# Patient Record
Sex: MALE | Race: WHITE | NOT HISPANIC OR LATINO | Employment: STUDENT | URBAN - METROPOLITAN AREA
[De-identification: names, ages, dates, MRNs, and addresses within clinical notes are randomized per-mention and may not be internally consistent; named-entity substitution may affect disease eponyms.]

---
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
## 2024-05-13 ENCOUNTER — OFFICE VISIT (OUTPATIENT)
Dept: OTOLARYNGOLOGY | Facility: CLINIC | Age: 24
End: 2024-05-13
Payer: COMMERCIAL

## 2024-05-13 VITALS — DIASTOLIC BLOOD PRESSURE: 76 MMHG | WEIGHT: 143.5 LBS | HEART RATE: 73 BPM | SYSTOLIC BLOOD PRESSURE: 122 MMHG

## 2024-05-13 DIAGNOSIS — R04.0 RIGHT-SIDED EPISTAXIS: Primary | ICD-10-CM

## 2024-05-13 DIAGNOSIS — R55 SYNCOPE, UNSPECIFIED SYNCOPE TYPE: ICD-10-CM

## 2024-05-13 PROCEDURE — 99204 OFFICE O/P NEW MOD 45 MIN: CPT | Mod: S$GLB,,, | Performed by: PHYSICIAN ASSISTANT

## 2024-05-13 PROCEDURE — 99999 PR PBB SHADOW E&M-NEW PATIENT-LVL II: CPT | Mod: PBBFAC,,, | Performed by: PHYSICIAN ASSISTANT

## 2024-05-13 NOTE — PROGRESS NOTES
Subjective:     Obed Giles is a 24 y.o. male who was self-referred for nosebleeds.    Patient reports a history of epistaxis when he was in high school.  Patient underwent cauterization at that time which resolved his symptoms.  Patient reports recurrent right-sided epistaxis as an adult which was more apparent in December of last year and has become now weekly for the last month.  Patient does not need to be blowing his nose or sneezing for the bleeding to start.  Patient usually controls the nosebleeding by pinching his nose and sometimes putting tissue paper in his nostril.     He has previously had nasal cauterization.  The bleeding has not required packing for control.  The last episode was 2 days ago, and is not currently active.  There is not a prior history of nasal surgery.  There is not a prior history of nasal trauma.  There is a history of environmental allergies.  He does not currently use nasal cannula.  He does not currently use a nasal spray.  There is not a family history to suggest a clotting disorder.  He does not currently take a blood-thinning agent    Past Medical History  He has no past medical history on file.    Past Surgical History  He has no past surgical history on file.    Family History  His family history includes Heart disease in his maternal grandmother.    Social History  He reports that he has never smoked. He does not have any smokeless tobacco history on file. He reports that he does not drink alcohol and does not use drugs.    Allergies  He has No Known Allergies.    Medications  He currently has no medications in their medication list.    Review of Systems   HENT: Positive for nosebleeds.             Objective:     /76 (BP Location: Right arm, Patient Position: Sitting, BP Method: Medium (Automatic))   Pulse 73   Wt 65.1 kg (143 lb 8.3 oz)      Constitutional:   Vital signs are normal. He appears well-developed and well-nourished. He is active. Normal speech.   "    Head:  Normocephalic and atraumatic.     Nose:  Rhinorrhea (clear) present. No mucosal edema or septal deviation. Epistaxis is observed. Turbinates normal, no turbinate masses and no turbinate hypertrophy.    No evidence of recent or active bleeding but there is a hypervascular focus on right anterior septum    Pulmonary/Chest:   Effort normal.     Psychiatric:   He has a normal mood and affect. His speech is normal and behavior is normal.       Procedure    Patient was prepared for cauterization in the office today.  While attempting to cauterize patient with syncopal episode.  Cauterization was incomplete.  See note below.    Data Reviewed    No results found for: "HGB"  No results found for: "HCT"  No results found for: "PLT"  No results found for: "LABPROT"  No results found for: "APTT"  No results found for: "INR"    Assessment and Plan:     1. Right-sided epistaxis  - unable to cauterize today.  Will defer for future  - Use aquaphor or vaseline ointment at least once daily in nasal vestibule  - Nasal saline spray/Ayr gel to nose at least twice daily  - Consider room humidifier  - Had a lengthy conversation with the patient regarding the etiology of epistaxis and management strategies.    - Patient provided detailed instructions for the management and prevention of nosebleeds and written instructions were given in their AVS.    - He was also advised about when to seek emergency care.        2. Syncope, unspecified syncope type  During cauterization of left anterior on epistaxis patient became pale and syncopized.  Patient had loss of consciousness for less than 10 seconds.  Some shaking of his peripheral extremities was evident but transient.  He quickly regained full consciousness.  He reports episodes of anxiety in the past.  Blood pressure stable with systolic in 90s and heart rate in the low 50s.  Patient initially feeling lightheaded with change in his vision but improved with lying supine.  Suspect " this is a vasovagal episode from anxiety towards nasal cautery.  Patient denies a history of seizures or frequent syncope.  Last syncopal episode was after developing flu. Positive orthostatics in office today which improved with time and oral hydration.   Patient able to walk out on own strength.     He will follow up in three weeks.   I had a discussion with the patient and his SO  regarding his condition and the further workup and management options.    All questions were answered, and the patient is in agreement with the above.

## 2024-06-03 ENCOUNTER — OFFICE VISIT (OUTPATIENT)
Dept: OTOLARYNGOLOGY | Facility: CLINIC | Age: 24
End: 2024-06-03
Payer: COMMERCIAL

## 2024-06-03 VITALS — SYSTOLIC BLOOD PRESSURE: 116 MMHG | DIASTOLIC BLOOD PRESSURE: 74 MMHG | HEART RATE: 70 BPM | WEIGHT: 145.5 LBS

## 2024-06-03 DIAGNOSIS — R04.0 RIGHT-SIDED EPISTAXIS: Primary | ICD-10-CM

## 2024-06-03 PROCEDURE — 99999 PR PBB SHADOW E&M-EST. PATIENT-LVL II: CPT | Mod: PBBFAC,,, | Performed by: PHYSICIAN ASSISTANT

## 2024-06-03 PROCEDURE — 99213 OFFICE O/P EST LOW 20 MIN: CPT | Mod: S$GLB,,, | Performed by: PHYSICIAN ASSISTANT

## 2024-06-03 NOTE — PROGRESS NOTES
Subjective:      Obed is a 24 y.o. male who comes for follow-up of RIGHT nosebleeds.  His last visit with me was on 5/13/2024.      Patient with no further epistaxis after last appointment.  Patient used ointment temporarily but has been consistent with saline sprays.  Patient has a voided blowing his nose has been avoiding any trauma to his nose.    Per last office visit 5/13/2024 :  Patient reports a history of epistaxis when he was in high school.  Patient underwent cauterization at that time which resolved his symptoms.  Patient reports recurrent right-sided epistaxis as an adult which was more apparent in December of last year and has become now weekly for the last month.  Patient does not need to be blowing his nose or sneezing for the bleeding to start.  Patient usually controls the nosebleeding by pinching his nose and sometimes putting tissue paper in his nostril.      He has previously had nasal cauterization.  The bleeding has not required packing for control.  The last episode was 2 days ago, and is not currently active.  There is not a prior history of nasal surgery.  There is not a prior history of nasal trauma.  There is a history of environmental allergies.  He does not currently use nasal cannula.  He does not currently use a nasal spray.  There is not a family history to suggest a clotting disorder.  He does not currently take a blood-thinning agent     1. Right-sided epistaxis  - unable to cauterize today due to syncope (see below).  Will defer for future  - Use aquaphor or vaseline ointment at least once daily in nasal vestibule  - Nasal saline spray/Ayr gel to nose at least twice daily  - Consider room humidifier  - Had a lengthy conversation with the patient regarding the etiology of epistaxis and management strategies.    - Patient provided detailed instructions for the management and prevention of nosebleeds and written instructions were given in their AVS.    - He was also advised about  "when to seek emergency care.        2. Syncope, unspecified syncope type  During cauterization of left anterior on epistaxis patient became pale and syncopized.  Patient had loss of consciousness for less than 10 seconds.  Some shaking of his peripheral extremities was evident but transient.  He quickly regained full consciousness.  He reports episodes of anxiety in the past.  Blood pressure stable with systolic in 90s and heart rate in the low 50s.  Patient initially feeling lightheaded with change in his vision but improved with lying supine.  Suspect this is a vasovagal episode from anxiety towards nasal cautery.  Patient denies a history of seizures or frequent syncope.  Last syncopal episode was after developing flu. Positive orthostatics in office today which improved with time and oral hydration.   Patient able to walk out on own strength.       The patient's medications, allergies, past medical, surgical, social and family histories were reviewed and updated as appropriate.    A detailed review of systems was obtained with pertinent positives as per the above HPI, and otherwise negative.        Objective:     /74 (BP Location: Right arm, Patient Position: Sitting)   Pulse 70   Wt 66 kg (145 lb 8.1 oz)      Physical Exam  Vitals reviewed.   Constitutional:       Appearance: Normal appearance.   HENT:      Head: Normocephalic and atraumatic.      Right Ear: External ear normal.      Left Ear: External ear normal.      Nose: Rhinorrhea present. No septal deviation. Rhinorrhea is clear.      Comments: Erythemaous area of L anterior septum without obvious focus of recent bleeding  L anterior septum with a few small area of possible epistaxis foci  Eyes:      Conjunctiva/sclera: Conjunctivae normal.   Pulmonary:      Effort: Pulmonary effort is normal.   Neurological:      Mental Status: He is alert.          Procedure    None    Data Reviewed    No results found for: "WBC"  No results found for: " ""EOSINOPHIL"  No results found for: "EOS"  No results found for: "PLT"  No results found for: "GLU"  No results found for: "IGE"    Assessment:     1. 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    All questions were answered, and the patient is in agreement with the above.     Disclaimer:  This note may have been prepared utilizing voice recognition software which may result in occasional typographical errors in the text such as sound alike words.   If further clarification is needed, please contact the ENT department of [facility].    "